# Patient Record
Sex: FEMALE | Race: BLACK OR AFRICAN AMERICAN
[De-identification: names, ages, dates, MRNs, and addresses within clinical notes are randomized per-mention and may not be internally consistent; named-entity substitution may affect disease eponyms.]

---

## 2019-03-27 ENCOUNTER — HOSPITAL ENCOUNTER (EMERGENCY)
Dept: HOSPITAL 47 - EC | Age: 19
Discharge: HOME | End: 2019-03-27
Payer: COMMERCIAL

## 2019-03-27 VITALS — HEART RATE: 76 BPM | DIASTOLIC BLOOD PRESSURE: 59 MMHG | RESPIRATION RATE: 18 BRPM | SYSTOLIC BLOOD PRESSURE: 104 MMHG

## 2019-03-27 VITALS — TEMPERATURE: 99 F

## 2019-03-27 DIAGNOSIS — R10.30: Primary | ICD-10-CM

## 2019-03-27 DIAGNOSIS — R51: ICD-10-CM

## 2019-03-27 LAB
ALBUMIN SERPL-MCNC: 4.3 G/DL (ref 3.5–5)
ALP SERPL-CCNC: 95 U/L (ref 38–126)
ALT SERPL-CCNC: 28 U/L (ref 9–52)
AMYLASE SERPL-CCNC: 43 U/L (ref 30–110)
ANION GAP SERPL CALC-SCNC: 11 MMOL/L
AST SERPL-CCNC: 19 U/L (ref 14–36)
BASOPHILS # BLD AUTO: 0 K/UL (ref 0–0.2)
BASOPHILS NFR BLD AUTO: 0 %
BUN SERPL-SCNC: 9 MG/DL (ref 7–17)
CALCIUM SPEC-MCNC: 9.4 MG/DL (ref 8.4–10.2)
CHLORIDE SERPL-SCNC: 106 MMOL/L (ref 98–107)
CO2 SERPL-SCNC: 23 MMOL/L (ref 22–30)
EOSINOPHIL # BLD AUTO: 0.2 K/UL (ref 0–0.7)
EOSINOPHIL NFR BLD AUTO: 2 %
ERYTHROCYTE [DISTWIDTH] IN BLOOD BY AUTOMATED COUNT: 4.41 M/UL (ref 3.8–5.4)
ERYTHROCYTE [DISTWIDTH] IN BLOOD: 13.8 % (ref 11.5–15.5)
GLUCOSE SERPL-MCNC: 85 MG/DL (ref 74–99)
HCT VFR BLD AUTO: 40.3 % (ref 34–46)
HGB BLD-MCNC: 12.9 GM/DL (ref 11.4–16)
LIPASE SERPL-CCNC: 43 U/L (ref 23–300)
LYMPHOCYTES # SPEC AUTO: 1.6 K/UL (ref 1–4.8)
LYMPHOCYTES NFR SPEC AUTO: 14 %
MCH RBC QN AUTO: 29.2 PG (ref 25–35)
MCHC RBC AUTO-ENTMCNC: 31.9 G/DL (ref 31–37)
MCV RBC AUTO: 91.5 FL (ref 80–100)
MONOCYTES # BLD AUTO: 0.4 K/UL (ref 0–1)
MONOCYTES NFR BLD AUTO: 3 %
NEUTROPHILS # BLD AUTO: 9.5 K/UL (ref 1.3–7.7)
NEUTROPHILS NFR BLD AUTO: 80 %
PH UR: 8 [PH] (ref 5–8)
PLATELET # BLD AUTO: 231 K/UL (ref 150–450)
POTASSIUM SERPL-SCNC: 3.9 MMOL/L (ref 3.5–5.1)
PROT SERPL-MCNC: 7.1 G/DL (ref 6.3–8.2)
RBC UR QL: 1 /HPF (ref 0–5)
SODIUM SERPL-SCNC: 140 MMOL/L (ref 137–145)
SP GR UR: 1.01 (ref 1–1.03)
SQUAMOUS UR QL AUTO: 9 /HPF (ref 0–4)
UROBILINOGEN UR QL STRIP: <2 MG/DL (ref ?–2)
WBC # BLD AUTO: 11.8 K/UL (ref 4–11)
WBC #/AREA URNS HPF: 2 /HPF (ref 0–5)

## 2019-03-27 PROCEDURE — 36415 COLL VENOUS BLD VENIPUNCTURE: CPT

## 2019-03-27 PROCEDURE — 96361 HYDRATE IV INFUSION ADD-ON: CPT

## 2019-03-27 PROCEDURE — 93976 VASCULAR STUDY: CPT

## 2019-03-27 PROCEDURE — 83690 ASSAY OF LIPASE: CPT

## 2019-03-27 PROCEDURE — 99284 EMERGENCY DEPT VISIT MOD MDM: CPT

## 2019-03-27 PROCEDURE — 85025 COMPLETE CBC W/AUTO DIFF WBC: CPT

## 2019-03-27 PROCEDURE — 96374 THER/PROPH/DIAG INJ IV PUSH: CPT

## 2019-03-27 PROCEDURE — 82150 ASSAY OF AMYLASE: CPT

## 2019-03-27 PROCEDURE — 80053 COMPREHEN METABOLIC PANEL: CPT

## 2019-03-27 PROCEDURE — 81025 URINE PREGNANCY TEST: CPT

## 2019-03-27 PROCEDURE — 96375 TX/PRO/DX INJ NEW DRUG ADDON: CPT

## 2019-03-27 PROCEDURE — 76830 TRANSVAGINAL US NON-OB: CPT

## 2019-03-27 PROCEDURE — 81001 URINALYSIS AUTO W/SCOPE: CPT

## 2019-03-27 NOTE — US
EXAMINATION TYPE: US transvaginal

 

DATE OF EXAM: 3/27/2019

 

COMPARISON: NONE

 

CLINICAL HISTORY: Pain. Cramping and headache.

 

TECHNIQUE:  Transvaginal (TV). 

 

Date of LMP:  03/20/2019

 

EXAM MEASUREMENTS:

 

Uterus:  7.7 x 3.3 x 6.6  cm

Endometrial Stripe: 0.3 cm

Right Ovary:  4.0 x 2.2 x 1.7  cm

 

 

 

1. Uterus:  Anteverted   wnl

2. Endometrium:  wnl

3. Right Ovary:  Echogenic area seen.

4. Left Ovary:  Obscured by overlying bowel gas

**Spectral, color and waveform doppler imaging shows good arterial and venous flow within the right o
vary; there is no evidence for ovarian torsion.

5. Bilateral Adnexa:  wnl

6. Posterior cul-de-sac:  wnl

 

 

 

IMPRESSION: Negative transvaginal pelvic sonogram. No evidence of ovarian torsion. No adnexal mass or
 free fluid.

## 2019-03-27 NOTE — ED
Abdominal Pain HPI





- General


Source: patient


Mode of arrival: wheelchair


Limitations: no limitations





<Arin Clark - Last Filed: 03/27/19 22:33>





<Heide Veliz - Last Filed: 03/28/19 03:55>





- General


Chief Complaint: Abdominal Pain


Stated Complaint: Cramps and migraine


Time Seen by Provider: 03/27/19 18:43





- History of Present Illness


Initial Comments: 


19-year-old female patient presents to the emergency department today for 

evaluation of lower abdominal pain and headache.  Patient states the pain in her

abdomen started this morning.  She describes as a cramping type pain.  Denies 

any radiation of the pain through to her back.  Patient states that the pain has

been constant.  She denies any as the patient, diarrhea, nausea, vomiting, 

hematuria, dysuria, urinary frequency, urinary urgency.  States shortly after 

the cramping started that she developed a headache.  Patient states the headache

is frontal.  States that she does have some light and sound sensitivity but 

denies any blurred or double vision.  Denies any fever or chills with this.  

Patient states she does have a history of similar type headaches.  Patient 

states her period ended yesterday.  States it lasted for 3 days which is not 

unusual for her.  States that she does take birth control pills.  She denies any

abnormal vaginal discharge or itching. Patient denies any recent rash, shortness

breath, chest pain, numbness, tingling, dizziness, weakness, or any other 

complaints.


 (Arin Clark)





- Related Data


                                  Previous Rx's











 Medication  Instructions  Recorded


 


Ibuprofen [Motrin] 600 mg PO Q8HR PRN #30 tab 03/27/19











                                    Allergies











Allergy/AdvReac Type Severity Reaction Status Date / Time


 


No Known Allergies Allergy   Verified 03/27/19 19:01














Review of Systems


ROS Other: All systems not noted in ROS Statement are negative.





<Arin Clark - Last Filed: 03/27/19 22:33>


ROS Other: All systems not noted in ROS Statement are negative.





<Heide Veliz - Last Filed: 03/28/19 03:55>


ROS Statement: 


Those systems with pertinent positive or pertinent negative responses have been 

documented in the HPI.








Past Medical History


Past Medical History: No Reported History


History of Any Multi-Drug Resistant Organisms: None Reported


Past Surgical History: No Surgical Hx Reported


Past Psychological History: No Psychological Hx Reported


Smoking Status: Never smoker


Past Alcohol Use History: None Reported


Past Drug Use History: None Reported





<Arin Clark - Last Filed: 03/27/19 22:33>





General Exam


Limitations: no limitations


General appearance: alert, in no apparent distress, other (Physical well-

developed, well-nourished adult female patient in no acute distress.  Vital 

signs upon presentation are temperature 99.0F, pulse 107, respirations 16, 

blood pressure 94/45, pulse ox 100% on room air.)


Eye exam: Present: normal appearance, PERRL, EOMI.  Absent: scleral icterus, 

conjunctival injection, periorbital swelling


ENT exam: Present: normal exam, normal oropharynx, mucous membranes moist


Respiratory exam: Present: normal lung sounds bilaterally.  Absent: respiratory 

distress, wheezes, rales, rhonchi, stridor


Cardiovascular Exam: Present: regular rate, normal rhythm, normal heart sounds. 

 Absent: systolic murmur, diastolic murmur, rubs, gallop, clicks


GI/Abdominal exam: Present: soft, tenderness (Lower abdominal tenderness worse 

over the suprapubic region), normal bowel sounds.  Absent: distended, guarding, 

rebound, rigid


Neurological exam: Present: alert, oriented X3, CN II-XII intact


Psychiatric exam: Present: normal affect, normal mood


Skin exam: Present: warm, dry, intact, normal color.  Absent: rash





<Arin Clark - Last Filed: 03/27/19 22:33>





Course





                                   Vital Signs











  03/27/19 03/27/19





  18:11 22:16


 


Temperature 99 F 


 


Pulse Rate 107 H 76


 


Respiratory 16 18





Rate  


 


Blood Pressure 94/45 104/59


 


O2 Sat by Pulse 100 99





Oximetry  














Medical Decision Making





- Lab Data


Result diagrams: 


                                 03/27/19 19:40





                                 03/27/19 19:40





- Radiology Data


Radiology results: report reviewed





<Arin Clark - Last Filed: 03/27/19 22:33>





- Lab Data


Result diagrams: 


                                 03/27/19 19:40





                                 03/27/19 19:40





<Heide Veliz - Last Filed: 03/28/19 03:55>





- Medical Decision Making


19-year-old female patient presents to the emergency department today for 

evaluation of headache and pelvic cramping.  Physical examination did reveal 

tenderness over the suprapubic region.  Labs reviewed and were unremarkable.  

Ultrasound was obtained and showed no evidence of acute pelvic abnormalities.  

Upon reevaluation patient does report improvement of symptoms.  States she is 

having some mild cramping in the lower abdomen.  She is afebrile, vital signs 

stable.  Patient's period ended yesterday.  We'll discharge home at this time to

 follow-up with her primary care physician.  She'll be a prescription for 

ibuprofen.  Return parameters were discussed in detail.  She verbalizes 

understanding and agrees with this plan.


 (Arin Clark)


I was available for consultation in the emergency department. The history and 

physical exam were done by the midlevel provider.  I was consulted for this 

patient's care.  I reviewed the case with the midlevel provider and based on 

their presentation of the patient, I agree with the assessment, medical decision

 making and plan of care as documented. (Heide Veliz)





- Lab Data





                                   Lab Results











  03/27/19 03/27/19 03/27/19 Range/Units





  19:40 19:40 19:40 


 


WBC   11.8 H   (4.0-11.0)  k/uL


 


RBC   4.41   (3.80-5.40)  m/uL


 


Hgb   12.9   (11.4-16.0)  gm/dL


 


Hct   40.3   (34.0-46.0)  %


 


MCV   91.5   (80.0-100.0)  fL


 


MCH   29.2   (25.0-35.0)  pg


 


MCHC   31.9   (31.0-37.0)  g/dL


 


RDW   13.8   (11.5-15.5)  %


 


Plt Count   231   (150-450)  k/uL


 


Neutrophils %   80   %


 


Lymphocytes %   14   %


 


Monocytes %   3   %


 


Eosinophils %   2   %


 


Basophils %   0   %


 


Neutrophils #   9.5 H   (1.3-7.7)  k/uL


 


Lymphocytes #   1.6   (1.0-4.8)  k/uL


 


Monocytes #   0.4   (0-1.0)  k/uL


 


Eosinophils #   0.2   (0-0.7)  k/uL


 


Basophils #   0.0   (0-0.2)  k/uL


 


Sodium  140    (137-145)  mmol/L


 


Potassium  3.9    (3.5-5.1)  mmol/L


 


Chloride  106    ()  mmol/L


 


Carbon Dioxide  23    (22-30)  mmol/L


 


Anion Gap  11    mmol/L


 


BUN  9    (7-17)  mg/dL


 


Creatinine  0.64    (0.52-1.04)  mg/dL


 


Est GFR (CKD-EPI)AfAm  >90    (>60 ml/min/1.73 sqM)  


 


Est GFR (CKD-EPI)NonAf  >90    (>60 ml/min/1.73 sqM)  


 


Glucose  85    (74-99)  mg/dL


 


Calcium  9.4    (8.4-10.2)  mg/dL


 


Total Bilirubin  0.7    (0.2-1.3)  mg/dL


 


AST  19    (14-36)  U/L


 


ALT  28    (9-52)  U/L


 


Alkaline Phosphatase  95    ()  U/L


 


Total Protein  7.1    (6.3-8.2)  g/dL


 


Albumin  4.3    (3.5-5.0)  g/dL


 


Amylase  43    ()  U/L


 


Lipase  43    ()  U/L


 


Urine Color    Light Yellow  


 


Urine Appearance    Clear  (Clear)  


 


Urine pH    8.0  (5.0-8.0)  


 


Ur Specific Gravity    1.014  (1.001-1.035)  


 


Urine Protein    Negative  (Negative)  


 


Urine Glucose (UA)    Negative  (Negative)  


 


Urine Ketones    Negative  (Negative)  


 


Urine Blood    Moderate H  (Negative)  


 


Urine Nitrite    Negative  (Negative)  


 


Urine Bilirubin    Negative  (Negative)  


 


Urine Urobilinogen    <2.0  (<2.0)  mg/dL


 


Ur Leukocyte Esterase    Negative  (Negative)  


 


Urine RBC    1  (0-5)  /hpf


 


Urine WBC    2  (0-5)  /hpf


 


Ur Squamous Epith Cells    9 H  (0-4)  /hpf


 


Amorphous Sediment    Rare H  (None)  /hpf


 


Urine Bacteria    Rare H  (None)  /hpf


 


Urine Mucus    Rare H  (None)  /hpf


 


Urine HCG, Qual     (Not Detectd)  














  03/27/19 Range/Units





  19:42 


 


WBC   (4.0-11.0)  k/uL


 


RBC   (3.80-5.40)  m/uL


 


Hgb   (11.4-16.0)  gm/dL


 


Hct   (34.0-46.0)  %


 


MCV   (80.0-100.0)  fL


 


MCH   (25.0-35.0)  pg


 


MCHC   (31.0-37.0)  g/dL


 


RDW   (11.5-15.5)  %


 


Plt Count   (150-450)  k/uL


 


Neutrophils %   %


 


Lymphocytes %   %


 


Monocytes %   %


 


Eosinophils %   %


 


Basophils %   %


 


Neutrophils #   (1.3-7.7)  k/uL


 


Lymphocytes #   (1.0-4.8)  k/uL


 


Monocytes #   (0-1.0)  k/uL


 


Eosinophils #   (0-0.7)  k/uL


 


Basophils #   (0-0.2)  k/uL


 


Sodium   (137-145)  mmol/L


 


Potassium   (3.5-5.1)  mmol/L


 


Chloride   ()  mmol/L


 


Carbon Dioxide   (22-30)  mmol/L


 


Anion Gap   mmol/L


 


BUN   (7-17)  mg/dL


 


Creatinine   (0.52-1.04)  mg/dL


 


Est GFR (CKD-EPI)AfAm   (>60 ml/min/1.73 sqM)  


 


Est GFR (CKD-EPI)NonAf   (>60 ml/min/1.73 sqM)  


 


Glucose   (74-99)  mg/dL


 


Calcium   (8.4-10.2)  mg/dL


 


Total Bilirubin   (0.2-1.3)  mg/dL


 


AST   (14-36)  U/L


 


ALT   (9-52)  U/L


 


Alkaline Phosphatase   ()  U/L


 


Total Protein   (6.3-8.2)  g/dL


 


Albumin   (3.5-5.0)  g/dL


 


Amylase   ()  U/L


 


Lipase   ()  U/L


 


Urine Color   


 


Urine Appearance   (Clear)  


 


Urine pH   (5.0-8.0)  


 


Ur Specific Gravity   (1.001-1.035)  


 


Urine Protein   (Negative)  


 


Urine Glucose (UA)   (Negative)  


 


Urine Ketones   (Negative)  


 


Urine Blood   (Negative)  


 


Urine Nitrite   (Negative)  


 


Urine Bilirubin   (Negative)  


 


Urine Urobilinogen   (<2.0)  mg/dL


 


Ur Leukocyte Esterase   (Negative)  


 


Urine RBC   (0-5)  /hpf


 


Urine WBC   (0-5)  /hpf


 


Ur Squamous Epith Cells   (0-4)  /hpf


 


Amorphous Sediment   (None)  /hpf


 


Urine Bacteria   (None)  /hpf


 


Urine Mucus   (None)  /hpf


 


Urine HCG, Qual  Not Detected  (Not Detectd)  














- Radiology Data


Transvaginal ultrasound was obtained.  Report was reviewed in its entirety.  

Impression by Dr. Ashford shows negative transvaginal pelvic sonogram.  No e

vidence of ovarian torsion.  No adnexal mass or free fluid. (Arin Clark)





Disposition


Is patient prescribed a controlled substance at d/c from ED?: No


Time of Disposition: 21:42





<Arin Clark - Last Filed: 03/27/19 22:33>





<Heide Veliz - Last Filed: 03/28/19 03:55>


Clinical Impression: 


 Abdominal pain, Headache





Disposition: HOME SELF-CARE


Condition: Good


Instructions (If sedation given, give patient instructions):  Acute Headache 

(ED), Abdominal Pain (ED)


Additional Instructions: 


Take medications as directed.  Follow-up with your primary care physician for 

recheck in 1-2 days.  Return to the emergency department immediately for any 

new, worsening, or concerning symptoms.


Prescriptions: 


Ibuprofen [Motrin] 600 mg PO Q8HR PRN #30 tab


 PRN Reason: Pain


Referrals: 


[facility] [NON-STAFF] - 1-2 days

## 2019-04-14 ENCOUNTER — HOSPITAL ENCOUNTER (EMERGENCY)
Dept: HOSPITAL 47 - EC | Age: 19
Discharge: HOME | End: 2019-04-14
Payer: COMMERCIAL

## 2019-04-14 VITALS
HEART RATE: 80 BPM | SYSTOLIC BLOOD PRESSURE: 122 MMHG | DIASTOLIC BLOOD PRESSURE: 69 MMHG | RESPIRATION RATE: 18 BRPM | TEMPERATURE: 97.6 F

## 2019-04-14 DIAGNOSIS — Z79.899: ICD-10-CM

## 2019-04-14 DIAGNOSIS — K59.00: ICD-10-CM

## 2019-04-14 DIAGNOSIS — N83.201: Primary | ICD-10-CM

## 2019-04-14 LAB
ALBUMIN SERPL-MCNC: 4.5 G/DL (ref 3.5–5)
ALP SERPL-CCNC: 72 U/L (ref 38–126)
ALT SERPL-CCNC: 15 U/L (ref 9–52)
ANION GAP SERPL CALC-SCNC: 11 MMOL/L
AST SERPL-CCNC: 23 U/L (ref 14–36)
BASOPHILS # BLD AUTO: 0 K/UL (ref 0–0.2)
BASOPHILS NFR BLD AUTO: 0 %
BUN SERPL-SCNC: 9 MG/DL (ref 7–17)
CALCIUM SPEC-MCNC: 9.7 MG/DL (ref 8.4–10.2)
CHLORIDE SERPL-SCNC: 105 MMOL/L (ref 98–107)
CO2 SERPL-SCNC: 24 MMOL/L (ref 22–30)
EOSINOPHIL # BLD AUTO: 0.2 K/UL (ref 0–0.7)
EOSINOPHIL NFR BLD AUTO: 2 %
ERYTHROCYTE [DISTWIDTH] IN BLOOD BY AUTOMATED COUNT: 4.55 M/UL (ref 3.8–5.4)
ERYTHROCYTE [DISTWIDTH] IN BLOOD: 13.7 % (ref 11.5–15.5)
GLUCOSE SERPL-MCNC: 100 MG/DL (ref 74–99)
HCT VFR BLD AUTO: 40.7 % (ref 34–46)
HGB BLD-MCNC: 13.4 GM/DL (ref 11.4–16)
LIPASE SERPL-CCNC: 55 U/L (ref 23–300)
LYMPHOCYTES # SPEC AUTO: 1.6 K/UL (ref 1–4.8)
LYMPHOCYTES NFR SPEC AUTO: 24 %
MCH RBC QN AUTO: 29.4 PG (ref 25–35)
MCHC RBC AUTO-ENTMCNC: 32.8 G/DL (ref 31–37)
MCV RBC AUTO: 89.5 FL (ref 80–100)
MONOCYTES # BLD AUTO: 0.3 K/UL (ref 0–1)
MONOCYTES NFR BLD AUTO: 5 %
NEUTROPHILS # BLD AUTO: 4.3 K/UL (ref 1.3–7.7)
NEUTROPHILS NFR BLD AUTO: 66 %
PH UR: 5.5 [PH] (ref 5–8)
PLATELET # BLD AUTO: 291 K/UL (ref 150–450)
POTASSIUM SERPL-SCNC: 4.5 MMOL/L (ref 3.5–5.1)
PROT SERPL-MCNC: 7.4 G/DL (ref 6.3–8.2)
SODIUM SERPL-SCNC: 140 MMOL/L (ref 137–145)
SP GR UR: 1.01 (ref 1–1.03)
UROBILINOGEN UR QL STRIP: <2 MG/DL (ref ?–2)
WBC # BLD AUTO: 6.5 K/UL (ref 4–11)

## 2019-04-14 PROCEDURE — 93975 VASCULAR STUDY: CPT

## 2019-04-14 PROCEDURE — 80053 COMPREHEN METABOLIC PANEL: CPT

## 2019-04-14 PROCEDURE — 81003 URINALYSIS AUTO W/O SCOPE: CPT

## 2019-04-14 PROCEDURE — 99285 EMERGENCY DEPT VISIT HI MDM: CPT

## 2019-04-14 PROCEDURE — 81025 URINE PREGNANCY TEST: CPT

## 2019-04-14 PROCEDURE — 96360 HYDRATION IV INFUSION INIT: CPT

## 2019-04-14 PROCEDURE — 74018 RADEX ABDOMEN 1 VIEW: CPT

## 2019-04-14 PROCEDURE — 76705 ECHO EXAM OF ABDOMEN: CPT

## 2019-04-14 PROCEDURE — 36415 COLL VENOUS BLD VENIPUNCTURE: CPT

## 2019-04-14 PROCEDURE — 83690 ASSAY OF LIPASE: CPT

## 2019-04-14 PROCEDURE — 85025 COMPLETE CBC W/AUTO DIFF WBC: CPT

## 2019-04-14 PROCEDURE — 83605 ASSAY OF LACTIC ACID: CPT

## 2019-04-14 PROCEDURE — 76830 TRANSVAGINAL US NON-OB: CPT

## 2019-04-14 NOTE — US
EXAMINATION TYPE: US transvaginal

 

DATE OF EXAM: 4/14/2019

 

COMPARISON: Previous study dated 3/27/2019

 

CLINICAL HISTORY: rlq pain.

 

TECHNIQUE:  Transvaginal (TV).  

 

Date of LMP:  03/24/2019

 

EXAM MEASUREMENTS:.

 

Uterus:  8.5 x 4.3 x 6.4 cm

Endometrial Stripe: 1.9  cm

Right Ovary:  5.2 x 4.9 x 4.9 cm

Left Ovary:  4.2 x 2.1 x 4.1 cm

 

1. Uterus:  Anteverted   wnl

2. Endometrium:  small amount of fluid seen in canal fundally.

3. Right Ovary:  complex cyst measures 4.8 x 3.7 x 4.2 cm 

4. Left Ovary:  wnl

**Spectral, color and waveform doppler imaging shows good arterial and venous flow within the ovaries
; there is no evidence for ovarian torsion.

5. Bilateral Adnexa:  wnl

6. Posterior cul-de-sac:  no free fluid

 

IMPRESSION: 

 

COMPLEX, 4.8 CM RIGHT OVARIAN CYST. SHORT-TERM FOLLOW-UP MAY BE WORTHWHILE. 

 

  .

## 2019-04-14 NOTE — US
EXAMINATION TYPE: US abdomen APPY

 

DATE OF EXAM: 4/14/2019

 

COMPARISON: NONE

 

CLINICAL HISTORY: Pain. RLQ pain for two weeks, patient states it feels like menstrual cramps.

 

APPENDIX

AP Diameter (normal < 6mm):  0.3 mm

     Measured outer wall to outer wall.

 

Is the appendix seen in its entirety from the proximal cecum to distal end:  no 

Is the appendix compressible:  yes 

Does the appendix wall appear hypervascular:  No 

Is an appendicolith present:  No 

Is there inflammatory changes or free fluid present:  No 

 

Tubular structure seen is believed to be normal appendix. Incidental note is made of complex right ov
reyna cyst measuring 4.6 x 3.5 x 4.4 cm with peripheral flow. 

 

IMPRESSION:  

1. NO DEFINITE EVIDENCE OF ACUTE APPENDICITIS.

2. COMPLEX RIGHT OVARIAN CYST.

## 2019-04-14 NOTE — XR
EXAMINATION TYPE: XR KUB , 2 VIEWS

 

DATE OF EXAM ORDERED: 4/14/2019

 

HISTORY: abdominal pain.

 

COMPARISON: None.

 

FINDINGS:  The lung bases are clear.

 

Within the abdomen, the abdominal gas pattern is normal. There is no evidence of obstruction or free 
air. No unusual calcifications are seen.

 

IMPRESSION: 

 

NO ACUTE INTRA-ABDOMINAL ABNORMALITY.

## 2019-04-14 NOTE — ED
General Adult HPI





- General


Chief complaint: Abdominal Pain


Stated complaint: Abd pain


Time Seen by Provider: 04/14/19 12:01


Source: patient, RN notes reviewed, old records reviewed


Mode of arrival: ambulatory


Limitations: no limitations





- History of Present Illness


Initial comments: 


19-year-old female patient with no pertinent past medical history presents to ED

with a cramping waxing waning right lower quadrant pain for approximately 2 

weeks.  His had similar pain in the past relating to ovarian cysts.  Patient 

reports that she was seen at urgent care approximately 2 weeks ago in which they

did abdominal x-ray and stated her pain is likely secondary to constipation.  

Patient does report that she has had some constipation and irregular stools.  Gume martinez was should have a normal bowel movement today.  Patient denies any nausea 

vomiting or diarrhea.  Patient states that the pain is a cramping pain which 

comes and goes, not associated with exertion or rest, associated with food.  

Denies all other complaints.  Patient states that she is not pregnant.





Systemic: Pt denies fatigue, myalgia, fever/chills, rash. Pt denies weakness, 

night sweats, weight loss. 


Neuro: Pt denies headache, visual disturbances, syncope or pre-syncope.


HEENT: Pt denies ocular discharge or irritation, otalgia, rhinorrhea, 

pharyngitis or notable lymphadenopathy. 


Cardiopulmonary: Pt denies chest pain, SOB, heart palpitations, dyspnea on 

exertion.  


Abdominal/GI: Pt denies n/v/d. 


: Pt denies dysuria, burning w/ urination, frequency/urgency. Denies new onset

urinary or bowel incontinence.  


MSK: Pt denies myalgia, loss of strength or function in extremities. 


Neuro: Pt denies new onset weakness, paresthesias. 








- Related Data


                                Home Medications











 Medication  Instructions  Recorded  Confirmed


 


Docusate [Colace] 100 mg PO DAILY 04/14/19 04/14/19


 


Polyethylene Glycol 3350 [Miralax] 17 gm PO DAILY 04/14/19 04/14/19











                                    Allergies











Allergy/AdvReac Type Severity Reaction Status Date / Time


 


No Known Allergies Allergy   Verified 04/14/19 12:04














Review of Systems


ROS Statement: 


Those systems with pertinent positive or pertinent negative responses have been 

documented in the HPI.





ROS Other: All systems not noted in ROS Statement are negative.





Past Medical History


Past Medical History: No Reported History


History of Any Multi-Drug Resistant Organisms: None Reported


Past Surgical History: No Surgical Hx Reported


Past Psychological History: No Psychological Hx Reported


Smoking Status: Never smoker


Past Alcohol Use History: None Reported


Past Drug Use History: None Reported





General Exam





- General Exam Comments


Initial Comments: 





Constitutional: NAD, AOX3, Pt has pleasant affect. 


HEENT: NC/AT, trachea midline, neck supple, no lymphadenopathy. Posterior 

pharynx non erythematous, without exudates. External ears appear normal, without

 discharge. Mucous membranes moist. Eyes PERRLA, EOM intact. There is no scleral

 icterus. No pallor noted. 


Cardiopulmonary: RRR, no murmurs, rubs or gallops, no JVD noted. Lungs CTAB in 

anterior and posterior fields. No peripheral edema. 


Abdominal exam: Abdomen soft and non-distended.  Lower quadrant nontender to 

palpation, no tenderness at McBurney's point.  No rebound tenderness, no areas 

of abdominal tenderness, no guarding no rigidity. Bowel sounds active in LLQ. No

 hepatosplenomegaly. No ecchymosis


Neuro: CN II-XII grossly intact. No nuchal rigidity. 


MSK: No posterior calf tenderness bilaterally, homans sign negative bilaterally.

 Posterior tibialis and radial pulse +2 bilaterally. Sensation intact in upper 

and lower extremities. Full active ROM in upper and lower extremities, 5/5 

stregnth. 











Limitations: no limitations





Course


                                   Vital Signs











  04/14/19





  11:57


 


Temperature 99.2 F


 


Pulse Rate 60


 


Respiratory 18





Rate 


 


Blood Pressure 118/66


 


O2 Sat by Pulse 99





Oximetry 














Medical Decision Making





- Medical Decision Making


19-year-old female patient with no pertinent past medical history presents to ED

 with a cramping waxing waning right lower quadrant pain for approximately 2 

weeks.  His had similar pain in the past relating to ovarian cysts.  Patient 

reports that she was seen at urgent care approximately 2 weeks ago in which they

 did abdominal x-ray and stated her pain is likely secondary to constipation.  

Patient does report that she has had some constipation and irregular stools.  

Patient was should have a normal bowel movement today.  Patient denies any 

nausea vomiting or diarrhea.  Patient states that the pain is a cramping pain 

which comes and goes, not associated with exertion or rest, associated with 

food.  Denies all other complaints.  Patient states that she is not pregnant.  

Patient vital signs stable, afebrile.  Physical exam displayed nontender 

abdomen.  Laboratory investigations reveal non-impressive CBC, CMP, UA.  HCG 

negative.  Right Lower Quadrant Displayed a Tubular Structure Believed to Be a 

Normal Appendix, incidental finding of complex right ovarian cyst.  Transvaginal

 trauma displayed a complex 4.8 centimeter right ovarian cyst.  KUB displayed no

 acute process.  Etiology of patient's waxing and waning right lower quadrant 

pain likely complex ovarian cyst.  Patient will be discharged with outpatient 

follow-up with OB/GYN as well as primary care provider.  Patient has established

 OB/GYN previous pregnancy that she over they'll to follow up with. Pt also 

provided ob/gyn referral from [facility] staff physician if unable to follow up 

with previously established ob/gyn.  Patient return to ER condition worsen an

yway.  Case discussed with Dr. Rosen. 








- Lab Data


Result diagrams: 


                                 04/14/19 12:34





                                 04/14/19 12:34


                                   Lab Results











  04/14/19 04/14/19 04/14/19 Range/Units





  12:34 12:34 12:34 


 


WBC  6.5    (4.0-11.0)  k/uL


 


RBC  4.55    (3.80-5.40)  m/uL


 


Hgb  13.4    (11.4-16.0)  gm/dL


 


Hct  40.7    (34.0-46.0)  %


 


MCV  89.5    (80.0-100.0)  fL


 


MCH  29.4    (25.0-35.0)  pg


 


MCHC  32.8    (31.0-37.0)  g/dL


 


RDW  13.7    (11.5-15.5)  %


 


Plt Count  291    (150-450)  k/uL


 


Neutrophils %  66    %


 


Lymphocytes %  24    %


 


Monocytes %  5    %


 


Eosinophils %  2    %


 


Basophils %  0    %


 


Neutrophils #  4.3    (1.3-7.7)  k/uL


 


Lymphocytes #  1.6    (1.0-4.8)  k/uL


 


Monocytes #  0.3    (0-1.0)  k/uL


 


Eosinophils #  0.2    (0-0.7)  k/uL


 


Basophils #  0.0    (0-0.2)  k/uL


 


Sodium   140   (137-145)  mmol/L


 


Potassium   4.5   (3.5-5.1)  mmol/L


 


Chloride   105   ()  mmol/L


 


Carbon Dioxide   24   (22-30)  mmol/L


 


Anion Gap   11   mmol/L


 


BUN   9   (7-17)  mg/dL


 


Creatinine   0.55   (0.52-1.04)  mg/dL


 


Est GFR (CKD-EPI)AfAm   >90   (>60 ml/min/1.73 sqM)  


 


Est GFR (CKD-EPI)NonAf   >90   (>60 ml/min/1.73 sqM)  


 


Glucose   100 H   (74-99)  mg/dL


 


Plasma Lactic Acid Joey     (0.7-2.0)  mmol/L


 


Calcium   9.7   (8.4-10.2)  mg/dL


 


Total Bilirubin   0.5   (0.2-1.3)  mg/dL


 


AST   23   (14-36)  U/L


 


ALT   15   (9-52)  U/L


 


Alkaline Phosphatase   72   ()  U/L


 


Total Protein   7.4   (6.3-8.2)  g/dL


 


Albumin   4.5   (3.5-5.0)  g/dL


 


Lipase   55   ()  U/L


 


Urine Color     


 


Urine Appearance     (Clear)  


 


Urine pH     (5.0-8.0)  


 


Ur Specific Gravity     (1.001-1.035)  


 


Urine Protein     (Negative)  


 


Urine Glucose (UA)     (Negative)  


 


Urine Ketones     (Negative)  


 


Urine Blood     (Negative)  


 


Urine Nitrite     (Negative)  


 


Urine Bilirubin     (Negative)  


 


Urine Urobilinogen     (<2.0)  mg/dL


 


Ur Leukocyte Esterase     (Negative)  


 


Urine HCG, Qual    Not Detected  (Not Detectd)  














  04/14/19 04/14/19 Range/Units





  12:34 12:34 


 


WBC    (4.0-11.0)  k/uL


 


RBC    (3.80-5.40)  m/uL


 


Hgb    (11.4-16.0)  gm/dL


 


Hct    (34.0-46.0)  %


 


MCV    (80.0-100.0)  fL


 


MCH    (25.0-35.0)  pg


 


MCHC    (31.0-37.0)  g/dL


 


RDW    (11.5-15.5)  %


 


Plt Count    (150-450)  k/uL


 


Neutrophils %    %


 


Lymphocytes %    %


 


Monocytes %    %


 


Eosinophils %    %


 


Basophils %    %


 


Neutrophils #    (1.3-7.7)  k/uL


 


Lymphocytes #    (1.0-4.8)  k/uL


 


Monocytes #    (0-1.0)  k/uL


 


Eosinophils #    (0-0.7)  k/uL


 


Basophils #    (0-0.2)  k/uL


 


Sodium    (137-145)  mmol/L


 


Potassium    (3.5-5.1)  mmol/L


 


Chloride    ()  mmol/L


 


Carbon Dioxide    (22-30)  mmol/L


 


Anion Gap    mmol/L


 


BUN    (7-17)  mg/dL


 


Creatinine    (0.52-1.04)  mg/dL


 


Est GFR (CKD-EPI)AfAm    (>60 ml/min/1.73 sqM)  


 


Est GFR (CKD-EPI)NonAf    (>60 ml/min/1.73 sqM)  


 


Glucose    (74-99)  mg/dL


 


Plasma Lactic Acid Joey  1.3   (0.7-2.0)  mmol/L


 


Calcium    (8.4-10.2)  mg/dL


 


Total Bilirubin    (0.2-1.3)  mg/dL


 


AST    (14-36)  U/L


 


ALT    (9-52)  U/L


 


Alkaline Phosphatase    ()  U/L


 


Total Protein    (6.3-8.2)  g/dL


 


Albumin    (3.5-5.0)  g/dL


 


Lipase    ()  U/L


 


Urine Color   Yellow  


 


Urine Appearance   Clear  (Clear)  


 


Urine pH   5.5  (5.0-8.0)  


 


Ur Specific Gravity   1.015  (1.001-1.035)  


 


Urine Protein   Negative  (Negative)  


 


Urine Glucose (UA)   Negative  (Negative)  


 


Urine Ketones   Negative  (Negative)  


 


Urine Blood   Negative  (Negative)  


 


Urine Nitrite   Negative  (Negative)  


 


Urine Bilirubin   Negative  (Negative)  


 


Urine Urobilinogen   <2.0  (<2.0)  mg/dL


 


Ur Leukocyte Esterase   Negative  (Negative)  


 


Urine HCG, Qual    (Not Detectd)  














Disposition


Clinical Impression: 


 Ovarian cyst





Disposition: HOME SELF-CARE


Condition: Stable


Instructions (If sedation given, give patient instructions):  Ovarian Cyst (ED)


Additional Instructions: 


Patient to adhere to previously discussed treatment plan and will take 

medication(s) as directed. Patient to follow up with PCP in 1-2 days. Patient to

 return to ED if symptoms do not improve. 





Please follow-up with primary care provider and previously established OB/GYN in

 1-2 days for continued evaluation of right ovarian cyst.  Please return to ED 

if condition worsens in any way.  Additional OB/GYN referral provided if unable 

to establish care with previously established OB/GYN.


Is patient prescribed a controlled substance at d/c from ED?: No


Referrals: 


Nonstaff,Physician [Primary Care Provider] - 1-2 days


Brie Frausto MD [STAFF PHYSICIAN] - 1-2 days

## 2019-04-17 ENCOUNTER — HOSPITAL ENCOUNTER (EMERGENCY)
Dept: HOSPITAL 47 - EC | Age: 19
Discharge: HOME | End: 2019-04-17
Payer: COMMERCIAL

## 2019-04-17 VITALS
RESPIRATION RATE: 18 BRPM | DIASTOLIC BLOOD PRESSURE: 67 MMHG | TEMPERATURE: 98.5 F | HEART RATE: 96 BPM | SYSTOLIC BLOOD PRESSURE: 119 MMHG

## 2019-04-17 DIAGNOSIS — Z79.1: ICD-10-CM

## 2019-04-17 DIAGNOSIS — R10.2: ICD-10-CM

## 2019-04-17 DIAGNOSIS — Z87.42: ICD-10-CM

## 2019-04-17 DIAGNOSIS — Z79.51: ICD-10-CM

## 2019-04-17 DIAGNOSIS — N93.9: Primary | ICD-10-CM

## 2019-04-17 PROCEDURE — 99284 EMERGENCY DEPT VISIT MOD MDM: CPT

## 2019-04-17 NOTE — ED
Female Urogenital HPI





- General


Chief complaint: Vaginal Bleeding


Stated complaint: Vaginal Bleeding


Time Seen by Provider: 04/17/19 08:42


Source: patient, RN notes reviewed, old records reviewed


Mode of arrival: ambulatory


Limitations: no limitations





- History of Present Illness


Initial comments: 





Patient is a 19-year-old female presents emergency Department today with 

complaints of vaginal bleeding starting today.  She states she is here 4 days 

ago diagnosed with ovarian cysts.  That time she had negative pregnancy tests 

and laboratory was reviewed and normal.  She's been taking ibuprofen.  She 

complains of some right-sided pain. 





- Related Data


                                Home Medications











 Medication  Instructions  Recorded  Confirmed


 


Fluticasone Nasal Spray [Flonase 1 spray EA NOSTRIL DAILY 04/17/19 04/17/19





Nasal Spray]   


 


Ibuprofen [Motrin] 800 mg PO Q8H PRN 04/17/19 04/17/19


 


Loratadine [Claritin] 10 mg PO DAILY PRN 04/17/19 04/17/19











                                    Allergies











Allergy/AdvReac Type Severity Reaction Status Date / Time


 


No Known Allergies Allergy   Verified 04/17/19 08:57














Review of Systems


ROS Statement: 


Those systems with pertinent positive or pertinent negative responses have been 

documented in the HPI.





ROS Other: All systems not noted in ROS Statement are negative.





Past Medical History


Past Medical History: No Reported History


History of Any Multi-Drug Resistant Organisms: None Reported


Past Surgical History: No Surgical Hx Reported


Past Psychological History: No Psychological Hx Reported


Smoking Status: Never smoker


Past Alcohol Use History: None Reported


Past Drug Use History: None Reported





General Exam





- General Exam Comments


Initial Comments: 





19-year-old female.  Alert and oriented.  No distress.


Limitations: no limitations


General appearance: alert, in no apparent distress


Head exam: Present: atraumatic


Eye exam: Present: normal appearance, PERRL, EOMI.  Absent: scleral icterus, 

conjunctival injection, periorbital swelling


ENT exam: Present: normal exam, mucous membranes moist


Neck exam: Present: normal inspection.  Absent: tenderness, meningismus, 

lymphadenopathy


Respiratory exam: Present: normal lung sounds bilaterally.  Absent: respiratory 

distress, wheezes, rales, rhonchi, stridor


Cardiovascular Exam: Present: regular rate, normal rhythm, normal heart sounds. 

 Absent: systolic murmur, diastolic murmur, rubs, gallop, clicks


GI/Abdominal exam: Present: soft, normal bowel sounds.  Absent: distended, 

tenderness, guarding, rebound, rigid


Extremities exam: Present: normal inspection, full ROM, normal capillary refill.

  Absent: tenderness, pedal edema, joint swelling, calf tenderness


Back exam: Present: normal inspection


Neurological exam: Present: alert, oriented X3, CN II-XII intact


Psychiatric exam: Present: normal affect, normal mood


Skin exam: Present: warm, dry, intact, normal color.  Absent: rash





Course





                                   Vital Signs











  04/17/19





  08:37


 


Temperature 98.5 F


 


Pulse Rate 96


 


Respiratory 18





Rate 


 


Blood Pressure 119/67


 


O2 Sat by Pulse 99





Oximetry 














Medical Decision Making





- Medical Decision Making





Patient is a 19-year-old female who presents today with vaginal bleeding started

 today.  She was seen in the emergency department 3 days ago and diagnosed with 

a right-sided ovarian cyst.  At this time Patient had normal laboratory days ago

 and a negative hCG.  She denies any dysuria or vaginal discharge.  At this time

 Patient is a far less likely discharged home alone a milligrams by mouth she 

states her rectal cycles.  On the 24th.  I discussed the common for menstrual 

cycle to come early from time to time.  Patient has been advised she is follow-

up with OB/GYN.  She states she sees one in [place].  Discussed return parameters.

  All questions answered.





Disposition


Clinical Impression: 


 Vaginal bleeding





Disposition: HOME SELF-CARE


Condition: Good


Instructions (If sedation given, give patient instructions):  Menstruation (ED)


Additional Instructions: 


Follow-up with primary care doctor and OB/GYN.  Return to emergency department 

if any alarming signs or symptoms occur.


Is patient prescribed a controlled substance at d/c from ED?: No


Referrals: 


Nonstaff,Physician [Primary Care Provider] - 1-2 days


Time of Disposition: 09:25